# Patient Record
Sex: MALE | Race: WHITE | NOT HISPANIC OR LATINO | Employment: OTHER | URBAN - NONMETROPOLITAN AREA
[De-identification: names, ages, dates, MRNs, and addresses within clinical notes are randomized per-mention and may not be internally consistent; named-entity substitution may affect disease eponyms.]

---

## 2024-06-19 ENCOUNTER — OFFICE VISIT (OUTPATIENT)
Dept: NEUROLOGY | Facility: OTHER | Age: 54
End: 2024-06-19
Attending: PSYCHIATRY & NEUROLOGY
Payer: COMMERCIAL

## 2024-06-19 VITALS
DIASTOLIC BLOOD PRESSURE: 86 MMHG | BODY MASS INDEX: 32.87 KG/M2 | OXYGEN SATURATION: 96 % | HEIGHT: 67 IN | RESPIRATION RATE: 18 BRPM | WEIGHT: 209.4 LBS | SYSTOLIC BLOOD PRESSURE: 130 MMHG | HEART RATE: 70 BPM | TEMPERATURE: 97.1 F

## 2024-06-19 DIAGNOSIS — G56.01 CARPAL TUNNEL SYNDROME OF RIGHT WRIST: Primary | ICD-10-CM

## 2024-06-19 DIAGNOSIS — G56.21 LESION OF RIGHT ULNAR NERVE: ICD-10-CM

## 2024-06-19 PROCEDURE — 99203 OFFICE O/P NEW LOW 30 MIN: CPT | Performed by: PSYCHIATRY & NEUROLOGY

## 2024-06-19 PROCEDURE — 95886 MUSC TEST DONE W/N TEST COMP: CPT | Performed by: PSYCHIATRY & NEUROLOGY

## 2024-06-19 PROCEDURE — 95911 NRV CNDJ TEST 9-10 STUDIES: CPT | Performed by: PSYCHIATRY & NEUROLOGY

## 2024-06-19 RX ORDER — MUSCLE RUB CREAM 100; 150 MG/G; MG/G
CREAM TOPICAL
COMMUNITY
Start: 2023-06-22

## 2024-06-19 RX ORDER — GABAPENTIN 600 MG/1
600 TABLET ORAL
COMMUNITY
Start: 2023-02-14

## 2024-06-19 RX ORDER — HYDROXYZINE HYDROCHLORIDE 25 MG/1
75 TABLET, FILM COATED ORAL
COMMUNITY
Start: 2023-02-14

## 2024-06-19 RX ORDER — GABAPENTIN 100 MG/1
100 CAPSULE ORAL
COMMUNITY
Start: 2023-02-14

## 2024-06-19 RX ORDER — IBUPROFEN 600 MG/1
600 TABLET, FILM COATED ORAL
COMMUNITY
Start: 2023-02-14

## 2024-06-19 RX ORDER — CHOLECALCIFEROL (VITAMIN D3) 50 MCG
1 TABLET ORAL AT BEDTIME
COMMUNITY
Start: 2023-02-14

## 2024-06-19 ASSESSMENT — PAIN SCALES - GENERAL: PAINLEVEL: NO PAIN (0)

## 2024-06-19 NOTE — NURSING NOTE
Patient is here for EMG for concerns of RUE CTS.     Ella Walters LPN .............6/19/2024     11:39 AM

## 2024-06-19 NOTE — PROGRESS NOTES
Referred by LYNDSEY Cole    History: The patient relates a lengthy history of variable pain in the right hand.  He has appreciated loss of dexterity and strength.  There is been no obvious injury or precipitating event.  The patient is right-handed.    Past medical history: The patient does not have any significant chronic medical problems.    Review of systems: 10 system review of systems is primarily noteworthy for complaints of shoulder pain.  The patient has previously had rotator cuff surgery.    Social history: The patient has been unemployed for the last couple of years.  He relates that he stopped working following rotator cuff surgery.    Physical examination: The patient shows prominent weakness on the right for the interossei with mild weakness for the abductors of the thumb.  There is normal strength for these muscle groups on the left.  Strength is normal for the forearm flexors and extensors.  Reflexes are symmetric in the upper extremities.  Pinprick is intact in the C5-C8 dermatomes bilaterally.  Gait is normal.    Nerve conduction studies: Motor nerve testing was performed for the right median and ulnar nerves.  There was moderate latency prolongation, amplitude reduction and slowing at the wrist for the median nerve.  There was moderately severe conduction block and slowing at the elbow for the ulnar nerve with corresponding absence of the H reflex.    Orthodromic mixed conduction studies were performed for the right median and ulnar nerves.  Antidromic sensory testing was performed for the second digital, fifth digital and radial nerves.  There was moderate latency prolongation and slowing at the wrist for the median nerve.  There was moderate latency prolongation and distal slowing for the second digital nerve with increased dispersion of the waveform.  No waveform could be elicited for the fifth digital nerve.    Monopolar EMG needle examination: EMG was performed for the right first dorsal  interosseous, extensor digitorum commonness, triceps, brachioradialis, and extensor pollicis brevis.  There were moderately severe chronic denervation changes for the first dorsal interosseous.  Study of the other muscles was normal.    Impression: The patient has moderate to severe chronic right ulnar neuropathy at the elbow.  There has been significant denervation of the ulnar supplied intrinsic hand muscles which constitutes an indication for ulnar nerve transposition.    There are findings on physical exam and nerve conduction testing to indicate the presence of moderate grade right carpal tunnel syndrome.  The degree of conduction slowing is great enough that resolution is unlikely with nonsurgical treatment.    Findings were reviewed with the patient.

## 2024-06-19 NOTE — LETTER
6/19/2024      Cruz Zarate  [address]  [place] [address]      Dear Colleague,    Thank you for referring your patient, Cruz Zarate, to the [facility]. Please see a copy of my visit note below.    Referred by LYNDSEY Cole    History: The patient relates a lengthy history of variable pain in the right hand.  He has appreciated loss of dexterity and strength.  There is been no obvious injury or precipitating event.  The patient is right-handed.    Past medical history: The patient does not have any significant chronic medical problems.    Review of systems: 10 system review of systems is primarily noteworthy for complaints of shoulder pain.  The patient has previously had rotator cuff surgery.    Social history: The patient has been unemployed for the last couple of years.  He relates that he stopped working following rotator cuff surgery.    Physical examination: The patient shows prominent weakness on the right for the interossei with mild weakness for the abductors of the thumb.  There is normal strength for these muscle groups on the left.  Strength is normal for the forearm flexors and extensors.  Reflexes are symmetric in the upper extremities.  Pinprick is intact in the C5-C8 dermatomes bilaterally.  Gait is normal.    Nerve conduction studies: Motor nerve testing was performed for the right median and ulnar nerves.  There was moderate latency prolongation, amplitude reduction and slowing at the wrist for the median nerve.  There was moderately severe conduction block and slowing at the elbow for the ulnar nerve with corresponding absence of the H reflex.    Orthodromic mixed conduction studies were performed for the right median and ulnar nerves.  Antidromic sensory testing was performed for the second digital, fifth digital and radial nerves.  There was moderate latency prolongation and slowing at the wrist for the median nerve.  There was moderate latency  prolongation and distal slowing for the second digital nerve with increased dispersion of the waveform.  No waveform could be elicited for the fifth digital nerve.    Monopolar EMG needle examination: EMG was performed for the right first dorsal interosseous, extensor digitorum commonness, triceps, brachioradialis, and extensor pollicis brevis.  There were moderately severe chronic denervation changes for the first dorsal interosseous.  Study of the other muscles was normal.    Impression: The patient has moderate to severe chronic right ulnar neuropathy at the elbow.  There has been significant denervation of the ulnar supplied intrinsic hand muscles which constitutes an indication for ulnar nerve transposition.    There are findings on physical exam and nerve conduction testing to indicate the presence of moderate grade right carpal tunnel syndrome.  The degree of conduction slowing is great enough that resolution is unlikely with nonsurgical treatment.    Findings were reviewed with the patient.      Again, thank you for allowing me to participate in the care of your patient.        Sincerely,        Wing Corona MD